# Patient Record
(demographics unavailable — no encounter records)

---

## 2024-10-30 NOTE — HISTORY OF PRESENT ILLNESS
[FreeTextEntry6] : 13 yo had allergic rraction to peanut seen @ [facility] [place] given EpiPen

## 2024-10-30 NOTE — HISTORY OF PRESENT ILLNESS
[FreeTextEntry6] : 11 yo [place] allergic rraction to [place] [facility] [place]

## 2024-10-30 NOTE — HISTORY OF PRESENT ILLNESS
[FreeTextEntry6] : 11 yo had allergic rraction to peanut seen @ [facility] [place] given EpiPen

## 2024-10-30 NOTE — DISCUSSION/SUMMARY
[FreeTextEntry1] : 13 yo h reviewed use of Epi Pen Strict avoidance of any peanut containing foodstuff If symptoms worsen or concerned, call/return to office. Questions answered.